# Patient Record
Sex: FEMALE | Race: WHITE
[De-identification: names, ages, dates, MRNs, and addresses within clinical notes are randomized per-mention and may not be internally consistent; named-entity substitution may affect disease eponyms.]

---

## 2017-03-15 ENCOUNTER — HOSPITAL ENCOUNTER (EMERGENCY)
Dept: HOSPITAL 10 - FTE | Age: 27
Discharge: HOME | End: 2017-03-15
Payer: COMMERCIAL

## 2017-03-15 VITALS
HEART RATE: 77 BPM | TEMPERATURE: 98.6 F | RESPIRATION RATE: 20 BRPM | SYSTOLIC BLOOD PRESSURE: 125 MMHG | DIASTOLIC BLOOD PRESSURE: 80 MMHG

## 2017-03-15 VITALS — HEIGHT: 55 IN | BODY MASS INDEX: 55.61 KG/M2 | WEIGHT: 240.3 LBS

## 2017-03-15 DIAGNOSIS — J06.9: Primary | ICD-10-CM

## 2017-03-15 PROCEDURE — 99284 EMERGENCY DEPT VISIT MOD MDM: CPT

## 2017-03-15 NOTE — ERD
ER Documentation


Chief Complaint


Date/Time


DATE: 3/15/17 


TIME: 12:11


Chief Complaint


COUGH AND CONGESTION FOR THE PAST FEW DAYS. SORE THROAT WITH INT  FEVERS





HPI


This is a 18-year-old female who presents to the emergency department today 

complaining of cough, congestion, sore throat and intermittent fevers for the 

past week.  States she has tried NyQuil with no improvement in symptoms.  

States that she keeps being sent home from class because of her cough.  States 

the cough is worse at night.  Denies any nausea vomiting or diarrhea.





ROS


All systems reviewed and are negative except as per history of present illness.





Medications


Home Meds


Active Scripts


Ibuprofen* (Motrin*) 600 Mg Tab, 600 MG PO Q6, #30 TAB


   Prov:BILLY MORALES PA-C         3/15/17


Dextromethorphan Hb-Promethazine Hcl (Promethazine DM Syrup) 473 Ml Syrup, 5 ML 

PO Q6H Y for COUGH, #4 OZ


   Prov:BILLY MORALES PA-C         3/15/17


Fluticasone Propionate (Flonase Allergy Relief) 9.9 Ml [place].susp, 2 SPRAY 

NASAL DAILY, #1 BOTTLE


   TO EACH NOSTRIL


   Prov:BILLY MORALES PA-C         3/15/17


Cetirizine Hcl* (Zyrtec*) 10 Mg Capsule, 10 MG PO DAILY, #14 TAB.CHEW


   Prov:BILLY MORALES PA-C         3/15/17


Azithromycin* (Zithromax*) 250 Mg Tablet, 250 MG PO .ZPACK AS DIRECTED, #6 TAB


   TAKE 500 MG (2 TABS) THE FIRST DAY THEN 250 MG (1 TAB) DAYS 2-5


   Prov:BILLY MORALES PA-C         3/15/17





PMhx/Soc


Medical and Surgical Hx:  pt denies Medical Hx


History of Surgery:  Yes (csect x3, 1 )


Hx Alcohol Use:  No


Hx Substance Use:  No


Hx Tobacco Use:  No





Physical Exam


Vitals





Vital Signs








  Date Time  Temp Pulse Resp B/P Pulse Ox O2 Delivery O2 Flow Rate FiO2


 


3/15/17 09:38 98.6 87 20 140/82 98   








Physical Exam


Const:    No acute distress


Head:   Atraumatic 


Eyes:    Normal Conjunctiva


ENT:    Ears TMs normal.  Nose no drainage.  Throat no erythema no exudate


Neck:               Full range of motion..~ No meningismus.


Resp:    Clear to auscultation bilaterally.  No absent breath sounds.  No 

wheezing.


Cardio:    Regular rate and rhythm, no murmurs


Abd:    Soft, non tender, non distended. Normal bowel sounds


Skin:    No petechiae or rashes


Neur:    Awake and alert


Psych:    Normal Mood and Affect





Procedures/MDM


This is a 18-year-old female who presents the emergency department today with 

fever cough, congestion, and sore throat for the past couple of days.  Patient 

physical exam is benign however given that the patient has had her symptoms for 

the past week and her cough is her primary complaint with the patient a 

prescription for azithromycin to treat possible bronchitis.  I do not feel the 

patient requires a chest x-ray at this time.  She is afebrile here in the 

emergency department.  Her oxygen saturation 98%.  She is not tachycardic.  Low 

suspicion for PE, pneumonia, abscess, pneumothorax.  Patient will also be given 

a prescription for promethazine, Zyrtec and Flonase to treat possible allergic 

rhinitis versus viral URI as her cough is worse at night..





 I have low suspicion for strep pharyngitis, peritonsillar abscess, 

retropharyngeal abscess, otitis media,  sinusitis, abscess, meningitis, sepsis, 

or other acute infectious bacterial process. 





 At this time the patient is stable for discharge and outpatient management.  

They should follow up with their PCP in the next 1-2.  They may return to the 

emergency department sooner if symptoms persist or worsen.  Patient understood 

and agreed with the plan.





Departure


Diagnosis:  


 Primary Impression:  


 URI (upper respiratory infection)


 URI type:  unspecified URI  Qualified Code:  J06.9 - Upper respiratory tract 

infection, unspecified type


Condition:  Fair


Patient Instructions:  Preventing Common Respiratory Infections


Referrals:  


[facility]


YOU HAVE RECEIVED A MEDICAL SCREENING EXAM AND THE RESULTS INDICATE THAT YOU DO 

NOT HAVE A CONDITION THAT REQUIRES URGENT TREATMENT IN THE EMERGENCY DEPARTMENT.





FURTHER EVALUATION AND TREATMENT OF YOUR CONDITION CAN WAIT UNTIL YOU ARE SEEN 

IN YOUR DOCTORS OFFICE WITHIN THE NEXT 1-2 DAYS. IT IS YOUR RESPONSIBILITY TO 

MAKE AN APPOINTMENT FOR FOLOW-UP CARE.





IF YOU HAVE A PRIMARY DOCTOR


--you should call your primary doctor and schedule an appointment





IF YOU DO NOT HAVE A PRIMARY DOCTOR YOU CAN CALL OUR PHYSICIAN REFERRAL HOTLINE 

AT


 [phone] 





IF YOU CAN NOT AFFORD TO SEE A PHYSICIAN YOU CAN CHOSE FROM THE FOLLOWING 

[facility]





[facility] [phone] [address] [facility]. [facility] [phone] [address] [facility]. [facility] [phone] [address] [facility]. [facility] [phone] [address] [facility]. [facility] [phone] [address] [facility]. [facility] [phone] [address]





Additional Instructions:  


Call your primary care doctor TOMORROW for an appointment during the next 1-2 

days.See the doctor sooner or return here if your condition worsens before your 

appointment time.





Take medications as prescribed











BILLY MORALES PA-C Mar 15, 2017 12:15

## 2018-05-08 ENCOUNTER — HOSPITAL ENCOUNTER (EMERGENCY)
Age: 28
Discharge: LEFT BEFORE DISCHARGE | End: 2018-05-08

## 2018-05-08 ENCOUNTER — HOSPITAL ENCOUNTER (EMERGENCY)
Dept: HOSPITAL 91 - E/R | Age: 28
Discharge: LEFT BEFORE DISCHARGE | End: 2018-05-08
Payer: SELF-PAY

## 2018-05-08 DIAGNOSIS — Z53.21: Primary | ICD-10-CM

## 2018-06-17 ENCOUNTER — HOSPITAL ENCOUNTER (INPATIENT)
Age: 28
LOS: 4 days | Discharge: HOME | End: 2018-06-21

## 2018-06-17 ENCOUNTER — HOSPITAL ENCOUNTER (INPATIENT)
Dept: HOSPITAL 91 - OBT | Age: 28
LOS: 4 days | Discharge: HOME | End: 2018-06-21
Payer: COMMERCIAL

## 2018-06-17 DIAGNOSIS — E66.9: ICD-10-CM

## 2018-06-17 DIAGNOSIS — Z30.2: ICD-10-CM

## 2018-06-17 DIAGNOSIS — Z3A.37: ICD-10-CM

## 2018-06-17 DIAGNOSIS — O34.211: Primary | ICD-10-CM

## 2018-06-17 LAB
ADD UMIC: NO
UR ASCORBIC ACID: NEGATIVE MG/DL
UR BILIRUBIN (DIP): NEGATIVE MG/DL
UR BLOOD (DIP): NEGATIVE MG/DL
UR CLARITY: CLEAR
UR COLOR: YELLOW
UR GLUCOSE (DIP): NEGATIVE MG/DL
UR KETONES (DIP): NEGATIVE MG/DL
UR LEUKOCYTE ESTERASE (DIP): NEGATIVE LEU/UL
UR NITRITE (DIP): NEGATIVE MG/DL
UR PH (DIP): 6 (ref 5–9)
UR SPECIFIC GRAVITY (DIP): 1.01 (ref 1–1.03)
UR TOTAL PROTEIN (DIP): NEGATIVE MG/DL
UR UROBILINOGEN (DIP): NEGATIVE MG/DL

## 2018-06-17 PROCEDURE — 87340 HEPATITIS B SURFACE AG IA: CPT

## 2018-06-17 PROCEDURE — 85610 PROTHROMBIN TIME: CPT

## 2018-06-17 PROCEDURE — 86850 RBC ANTIBODY SCREEN: CPT

## 2018-06-17 PROCEDURE — 86592 SYPHILIS TEST NON-TREP QUAL: CPT

## 2018-06-17 PROCEDURE — 85730 THROMBOPLASTIN TIME PARTIAL: CPT

## 2018-06-17 PROCEDURE — 76818 FETAL BIOPHYS PROFILE W/NST: CPT

## 2018-06-17 PROCEDURE — 86901 BLOOD TYPING SEROLOGIC RH(D): CPT

## 2018-06-17 PROCEDURE — 36415 COLL VENOUS BLD VENIPUNCTURE: CPT

## 2018-06-17 PROCEDURE — 96360 HYDRATION IV INFUSION INIT: CPT

## 2018-06-17 PROCEDURE — 96361 HYDRATE IV INFUSION ADD-ON: CPT

## 2018-06-17 PROCEDURE — 81003 URINALYSIS AUTO W/O SCOPE: CPT

## 2018-06-17 PROCEDURE — 96372 THER/PROPH/DIAG INJ SC/IM: CPT

## 2018-06-17 PROCEDURE — 86900 BLOOD TYPING SEROLOGIC ABO: CPT

## 2018-06-17 PROCEDURE — 85025 COMPLETE CBC W/AUTO DIFF WBC: CPT

## 2018-06-17 PROCEDURE — 87086 URINE CULTURE/COLONY COUNT: CPT

## 2018-06-17 PROCEDURE — 88302 TISSUE EXAM BY PATHOLOGIST: CPT

## 2018-06-17 RX ADMIN — TERBUTALINE SULFATE 1 MG: 1 INJECTION SUBCUTANEOUS at 23:40

## 2018-06-17 RX ADMIN — PYRIDOXINE HYDROCHLORIDE 1 MLS/HR: 100 INJECTION, SOLUTION INTRAMUSCULAR; INTRAVENOUS at 20:30

## 2018-06-17 RX ADMIN — PYRIDOXINE HYDROCHLORIDE 1 MLS/HR: 100 INJECTION, SOLUTION INTRAMUSCULAR; INTRAVENOUS at 21:22

## 2018-06-18 LAB
ADD MAN DIFF?: NO
BASOPHIL #: 0 10^3/UL (ref 0–0.1)
BASOPHILS %: 0.3 % (ref 0–2)
EOSINOPHILS #: 0.2 10^3/UL (ref 0–0.5)
EOSINOPHILS %: 1.7 % (ref 0–7)
HEMATOCRIT: 37.1 % (ref 37–47)
HEMOGLOBIN: 11.9 G/DL (ref 12–16)
HEPATITIS B SURFACE ANTIGEN: NEGATIVE
INR: 0.85
LYMPHOCYTES #: 2.4 10^3/UL (ref 0.8–2.9)
LYMPHOCYTES %: 24.8 % (ref 15–51)
MEAN CORPUSCULAR HEMOGLOBIN: 28.7 PG (ref 29–33)
MEAN CORPUSCULAR HGB CONC: 32.1 G/DL (ref 32–37)
MEAN CORPUSCULAR VOLUME: 89.4 FL (ref 82–101)
MEAN PLATELET VOLUME: 10.9 FL (ref 7.4–10.4)
MONOCYTE #: 0.8 10^3/UL (ref 0.3–0.9)
MONOCYTES %: 8.5 % (ref 0–11)
NEUTROPHIL #: 6.1 10^3/UL (ref 1.6–7.5)
NEUTROPHILS %: 63.2 % (ref 39–77)
NUCLEATED RED BLOOD CELLS #: 0 10^3/UL (ref 0–0)
NUCLEATED RED BLOOD CELLS%: 0 /100WBC (ref 0–0)
PARTIAL THROMBOPLASTIN TIME: 27.5 SEC (ref 25–35)
PLATELET COUNT: 266 10^3/UL (ref 140–415)
PROTIME: 11.7 SEC (ref 11.9–14.9)
PT RATIO: 0.9
RAPID PLASMA REAGIN: NONREACTIVE
RED BLOOD COUNT: 4.15 10^6/UL (ref 4.2–5.4)
RED CELL DISTRIBUTION WIDTH: 14 % (ref 11.5–14.5)
WHITE BLOOD COUNT: 9.7 10^3/UL (ref 4.8–10.8)

## 2018-06-18 PROCEDURE — 0UB70ZZ EXCISION OF BILATERAL FALLOPIAN TUBES, OPEN APPROACH: ICD-10-PCS

## 2018-06-18 RX ADMIN — Medication 1 MLS/HR: at 22:55

## 2018-06-18 RX ADMIN — AMPICILLIN 1 MLS/HR: 1 INJECTION, POWDER, FOR SOLUTION INTRAMUSCULAR; INTRAVENOUS at 16:45

## 2018-06-18 RX ADMIN — PYRIDOXINE HYDROCHLORIDE 1 MLS/HR: 100 INJECTION, SOLUTION INTRAMUSCULAR; INTRAVENOUS at 14:08

## 2018-06-18 RX ADMIN — CEFAZOLIN 1 MLS/HR: 1 INJECTION, POWDER, FOR SOLUTION INTRAMUSCULAR; INTRAVENOUS at 18:15

## 2018-06-18 RX ADMIN — PYRIDOXINE HYDROCHLORIDE 1 MLS/HR: 100 INJECTION, SOLUTION INTRAMUSCULAR; INTRAVENOUS at 15:20

## 2018-06-18 RX ADMIN — AMPICILLIN 1 MLS/HR: 1 INJECTION, POWDER, FOR SOLUTION INTRAMUSCULAR; INTRAVENOUS at 13:13

## 2018-06-18 RX ADMIN — PYRIDOXINE HYDROCHLORIDE 1 MLS/HR: 100 INJECTION, SOLUTION INTRAMUSCULAR; INTRAVENOUS at 16:58

## 2018-06-18 RX ADMIN — CEFAZOLIN 1 MLS/HR: 1 INJECTION, POWDER, FOR SOLUTION INTRAMUSCULAR; INTRAVENOUS at 22:45

## 2018-06-18 RX ADMIN — DOCUSATE SODIUM AND SENNOSIDES 1 TAB: 8.6; 5 TABLET, FILM COATED ORAL at 22:56

## 2018-06-18 RX ADMIN — AMPICILLIN 1 MLS/HR: 2 INJECTION, POWDER, FOR SOLUTION INTRAVENOUS at 04:46

## 2018-06-18 RX ADMIN — PYRIDOXINE HYDROCHLORIDE 1 MLS/HR: 100 INJECTION, SOLUTION INTRAMUSCULAR; INTRAVENOUS at 04:44

## 2018-06-18 RX ADMIN — PYRIDOXINE HYDROCHLORIDE 1 MLS/HR: 100 INJECTION, SOLUTION INTRAMUSCULAR; INTRAVENOUS at 20:14

## 2018-06-18 RX ADMIN — KETOROLAC TROMETHAMINE 1 MG: 30 INJECTION, SOLUTION INTRAMUSCULAR at 22:45

## 2018-06-18 RX ADMIN — AMPICILLIN 1 MLS/HR: 1 INJECTION, POWDER, FOR SOLUTION INTRAMUSCULAR; INTRAVENOUS at 09:04

## 2018-06-18 RX ADMIN — DIPHENHYDRAMINE HYDROCHLORIDE 1 MG: 50 INJECTION, SOLUTION INTRAMUSCULAR; INTRAVENOUS at 21:37

## 2018-06-19 LAB
ADD MAN DIFF?: NO
BASOPHIL #: 0 10^3/UL (ref 0–0.1)
BASOPHILS %: 0.2 % (ref 0–2)
EOSINOPHILS #: 0 10^3/UL (ref 0–0.5)
EOSINOPHILS %: 0.2 % (ref 0–7)
HEMATOCRIT: 31.1 % (ref 37–47)
HEMOGLOBIN: 10.5 G/DL (ref 12–16)
LYMPHOCYTES #: 2.7 10^3/UL (ref 0.8–2.9)
LYMPHOCYTES %: 20.9 % (ref 15–51)
MEAN CORPUSCULAR HEMOGLOBIN: 29.3 PG (ref 29–33)
MEAN CORPUSCULAR HGB CONC: 33.8 G/DL (ref 32–37)
MEAN CORPUSCULAR VOLUME: 86.9 FL (ref 82–101)
MEAN PLATELET VOLUME: 10.3 FL (ref 7.4–10.4)
MONOCYTE #: 0.9 10^3/UL (ref 0.3–0.9)
MONOCYTES %: 6.7 % (ref 0–11)
NEUTROPHIL #: 9.3 10^3/UL (ref 1.6–7.5)
NEUTROPHILS %: 71.4 % (ref 39–77)
NUCLEATED RED BLOOD CELLS #: 0 10^3/UL (ref 0–0)
NUCLEATED RED BLOOD CELLS%: 0 /100WBC (ref 0–0)
PLATELET COUNT: 223 10^3/UL (ref 140–415)
RED BLOOD COUNT: 3.58 10^6/UL (ref 4.2–5.4)
RED CELL DISTRIBUTION WIDTH: 13.5 % (ref 11.5–14.5)
WHITE BLOOD COUNT: 13 10^3/UL (ref 4.8–10.8)

## 2018-06-19 RX ADMIN — Medication 7 APPLIC: at 03:25

## 2018-06-19 RX ADMIN — Medication 1 MLS/HR: at 10:32

## 2018-06-19 RX ADMIN — PYRIDOXINE HYDROCHLORIDE 1 MLS/HR: 100 INJECTION, SOLUTION INTRAMUSCULAR; INTRAVENOUS at 12:45

## 2018-06-19 RX ADMIN — Medication 1 MLS/HR: at 08:12

## 2018-06-19 RX ADMIN — IBUPROFEN 1 MG: 600 TABLET ORAL at 18:09

## 2018-06-19 RX ADMIN — Medication 1 MLS/HR: at 14:32

## 2018-06-19 RX ADMIN — DOCUSATE SODIUM AND SENNOSIDES 1 TAB: 8.6; 5 TABLET, FILM COATED ORAL at 08:10

## 2018-06-19 RX ADMIN — Medication 1 MLS/HR: at 22:32

## 2018-06-19 RX ADMIN — Medication 1 MLS/HR: at 03:24

## 2018-06-19 RX ADMIN — Medication 1 MLS/HR: at 18:32

## 2018-06-19 RX ADMIN — DOCUSATE SODIUM AND SENNOSIDES 1 TAB: 8.6; 5 TABLET, FILM COATED ORAL at 21:44

## 2018-06-20 RX ADMIN — Medication 1 MLS/HR: at 02:32

## 2018-06-20 RX ADMIN — IBUPROFEN 1 MG: 600 TABLET ORAL at 12:12

## 2018-06-20 RX ADMIN — DOCUSATE SODIUM AND SENNOSIDES 1 TAB: 8.6; 5 TABLET, FILM COATED ORAL at 09:00

## 2018-06-20 RX ADMIN — IBUPROFEN 1 MG: 600 TABLET ORAL at 00:25

## 2018-06-20 RX ADMIN — IBUPROFEN 1 MG: 600 TABLET ORAL at 06:10

## 2018-06-20 RX ADMIN — IBUPROFEN 1 MG: 600 TABLET ORAL at 17:27

## 2018-06-20 RX ADMIN — DOCUSATE SODIUM AND SENNOSIDES 1 TAB: 8.6; 5 TABLET, FILM COATED ORAL at 21:19

## 2018-06-20 RX ADMIN — Medication 1 MLS/HR: at 06:32

## 2018-06-21 RX ADMIN — DOCUSATE SODIUM AND SENNOSIDES 1 TAB: 8.6; 5 TABLET, FILM COATED ORAL at 09:00

## 2018-06-21 RX ADMIN — IBUPROFEN 1 MG: 600 TABLET ORAL at 06:07

## 2018-06-21 RX ADMIN — IBUPROFEN 1 MG: 600 TABLET ORAL at 00:24

## 2018-06-21 RX ADMIN — CLOSTRIDIUM TETANI TOXOID ANTIGEN (FORMALDEHYDE INACTIVATED), CORYNEBACTERIUM DIPHTHERIAE TOXOID ANTIGEN (FORMALDEHYDE INACTIVATED), BORDETELLA PERTUSSIS TOXOID ANTIGEN (GLUTARALDEHYDE INACTIVATED), BORDETELLA PERTUSSIS FILAMENTOUS HEMAGGLUTININ ANTIGEN (FORMALDEHYDE INACTIVATED), BORDETELLA PERTUSSIS PERTACTIN ANTIGEN, AND BORDETELLA PERTUSSIS FIMBRIAE 2/3 ANTIGEN 1 ML: 5; 2; 2.5; 5; 3; 5 INJECTION, SUSPENSION INTRAMUSCULAR at 09:03
